# Patient Record
Sex: FEMALE | Race: WHITE
[De-identification: names, ages, dates, MRNs, and addresses within clinical notes are randomized per-mention and may not be internally consistent; named-entity substitution may affect disease eponyms.]

---

## 2019-10-28 ENCOUNTER — HOSPITAL ENCOUNTER (OUTPATIENT)
Dept: HOSPITAL 75 - RAD | Age: 19
End: 2019-10-28
Attending: INTERNAL MEDICINE
Payer: COMMERCIAL

## 2019-10-28 DIAGNOSIS — K59.09: Primary | ICD-10-CM

## 2019-10-28 PROCEDURE — 74250 X-RAY XM SM INT 1CNTRST STD: CPT

## 2019-11-01 NOTE — DIAGNOSTIC IMAGING REPORT
INDICATION: Constipation.



Abdominal films were obtained on 10/28/2019 and 11/01/2019.



FINDINGS: Film labeled 10/28/2019 demonstrates multiple Sitz

markers overlying the left upper quadrant, probably in the

gastric fundus. There is prominent stool throughout the colon.

There is no small bowel dilatation.



Film labeled day 5 on 11/01/2019 demonstrates five remaining Sitz

markers, two appearing over the distal descending colon and three

appear to be in the mid pelvis, probably in the sigmoid colon.

There is no small bowel distention. There appears to be a little

less stool in the colon compared to the prior study.



IMPRESSION:



No sign of bowel obstruction. Prominent stool in the colon.

Images obtained on day 1 (10/28/2019) and a 5 (11/01/2019)

demonstrate progression of Sitz markers. On the day 5 study,

there are five remaining Sitz markers in the colon, two in the

distal descending colon and three in the sigmoid region.



  Dictated on workstation # [id number]

## 2020-12-21 ENCOUNTER — HOSPITAL ENCOUNTER (OUTPATIENT)
Dept: HOSPITAL 75 - 4TH | Age: 20
Setting detail: OBSERVATION
LOS: 1 days | Discharge: HOME | End: 2020-12-22
Attending: FAMILY MEDICINE | Admitting: FAMILY MEDICINE
Payer: COMMERCIAL

## 2020-12-21 VITALS — DIASTOLIC BLOOD PRESSURE: 83 MMHG | SYSTOLIC BLOOD PRESSURE: 137 MMHG

## 2020-12-21 VITALS — DIASTOLIC BLOOD PRESSURE: 70 MMHG | SYSTOLIC BLOOD PRESSURE: 129 MMHG

## 2020-12-21 VITALS — WEIGHT: 143.74 LBS | BODY MASS INDEX: 23.95 KG/M2 | HEIGHT: 65 IN

## 2020-12-21 VITALS — DIASTOLIC BLOOD PRESSURE: 69 MMHG | SYSTOLIC BLOOD PRESSURE: 130 MMHG

## 2020-12-21 DIAGNOSIS — R11.2: ICD-10-CM

## 2020-12-21 DIAGNOSIS — K44.9: ICD-10-CM

## 2020-12-21 DIAGNOSIS — K59.09: ICD-10-CM

## 2020-12-21 DIAGNOSIS — R10.9: Primary | ICD-10-CM

## 2020-12-21 DIAGNOSIS — Z79.899: ICD-10-CM

## 2020-12-21 DIAGNOSIS — K21.9: ICD-10-CM

## 2020-12-21 DIAGNOSIS — I10: ICD-10-CM

## 2020-12-21 DIAGNOSIS — F41.9: ICD-10-CM

## 2020-12-21 LAB
ALBUMIN SERPL-MCNC: 4.3 GM/DL (ref 3.2–4.5)
ALP SERPL-CCNC: 60 U/L (ref 40–136)
ALT SERPL-CCNC: 17 U/L (ref 0–55)
BASOPHILS # BLD AUTO: 0 10^3/UL (ref 0–0.1)
BASOPHILS NFR BLD AUTO: 1 % (ref 0–10)
BILIRUB SERPL-MCNC: 0.6 MG/DL (ref 0.1–1)
BUN/CREAT SERPL: 18
CALCIUM SERPL-MCNC: 9 MG/DL (ref 8.5–10.1)
CHLORIDE SERPL-SCNC: 106 MMOL/L (ref 98–107)
CO2 SERPL-SCNC: 21 MMOL/L (ref 21–32)
CREAT SERPL-MCNC: 0.82 MG/DL (ref 0.6–1.3)
EOSINOPHIL # BLD AUTO: 0.1 10^3/UL (ref 0–0.3)
EOSINOPHIL NFR BLD AUTO: 1 % (ref 0–10)
GFR SERPLBLD BASED ON 1.73 SQ M-ARVRAT: > 60 ML/MIN
GLUCOSE SERPL-MCNC: 96 MG/DL (ref 70–105)
HCT VFR BLD CALC: 39 % (ref 35–52)
HGB BLD-MCNC: 13.4 G/DL (ref 11.5–16)
LYMPHOCYTES # BLD AUTO: 1.5 10^3/UL (ref 1–4)
LYMPHOCYTES NFR BLD AUTO: 20 % (ref 12–44)
MANUAL DIFFERENTIAL PERFORMED BLD QL: NO
MCH RBC QN AUTO: 30 PG (ref 25–34)
MCHC RBC AUTO-ENTMCNC: 34 G/DL (ref 32–36)
MCV RBC AUTO: 86 FL (ref 80–99)
MONOCYTES # BLD AUTO: 0.4 10^3/UL (ref 0–1)
MONOCYTES NFR BLD AUTO: 5 % (ref 0–12)
NEUTROPHILS # BLD AUTO: 5.4 10^3/UL (ref 1.8–7.8)
NEUTROPHILS NFR BLD AUTO: 73 % (ref 42–75)
PLATELET # BLD: 209 10^3/UL (ref 130–400)
PMV BLD AUTO: 10.4 FL (ref 9–12.2)
POTASSIUM SERPL-SCNC: 4 MMOL/L (ref 3.6–5)
PROT SERPL-MCNC: 7.2 GM/DL (ref 6.4–8.2)
SODIUM SERPL-SCNC: 138 MMOL/L (ref 135–145)
WBC # BLD AUTO: 7.5 10^3/UL (ref 4.3–11)

## 2020-12-21 PROCEDURE — 99211 OFF/OP EST MAY X REQ PHY/QHP: CPT

## 2020-12-21 PROCEDURE — 74178 CT ABD&PLV WO CNTR FLWD CNTR: CPT

## 2020-12-21 PROCEDURE — 80053 COMPREHEN METABOLIC PANEL: CPT

## 2020-12-21 PROCEDURE — 85025 COMPLETE CBC W/AUTO DIFF WBC: CPT

## 2020-12-21 PROCEDURE — 36415 COLL VENOUS BLD VENIPUNCTURE: CPT

## 2020-12-21 RX ADMIN — SODIUM CHLORIDE, SODIUM LACTATE, POTASSIUM CHLORIDE, AND CALCIUM CHLORIDE SCH MLS/HR: 600; 310; 30; 20 INJECTION, SOLUTION INTRAVENOUS at 22:30

## 2020-12-21 RX ADMIN — LORAZEPAM PRN MG: 2 INJECTION INTRAMUSCULAR; INTRAVENOUS at 20:12

## 2020-12-21 RX ADMIN — SODIUM CHLORIDE, SODIUM LACTATE, POTASSIUM CHLORIDE, AND CALCIUM CHLORIDE SCH MLS/HR: 600; 310; 30; 20 INJECTION, SOLUTION INTRAVENOUS at 15:41

## 2020-12-21 RX ADMIN — ACETAMINOPHEN PRN MG: 500 TABLET ORAL at 18:54

## 2020-12-21 RX ADMIN — FENTANYL CITRATE PRN MCG: 50 INJECTION, SOLUTION INTRAMUSCULAR; INTRAVENOUS at 21:02

## 2020-12-21 NOTE — HISTORY & PHYSICIAL
History of Present Illness


History of Present Illness


Reason for visit/HPI


PT IS A 21 Y/O FEMALE WHO IS A NEW PATIENT TO MY PRACTICE.  SHE PRESENTED TO THE

OFFICE TODAY WITH SEVERE ABDOMINAL PAIN AND CONSTIPATION THAT CAUSED NAUSEA AND 

EMESIS.  SHE REPORTS THAT SHE WAS FEELING LIKE SHE NEEDED A BOWEL MOVEMENT AND 

SHE SAT DOWN ON THE TOILET, ATTEMPTING TO EMPTY HER BOWELS, AND BECAME ACUTELY 

NAUSEATED.


SHE REPORTS AN EXTENSIVE GI HISTORY AND ACTUALLY HAD A PHONE CALL FROM HER 

PEDIATRIC GASTROENTEROLOGIST TODAY INSTRUCTING HER TO GO TO THE EMERGENCY 

DEPARTMENT FOR ACUTE EVALUATION WHILE SHE WAS IN MY OFFICE BEING EXAMINED THIS 

AFTERNOON.


SHE REPORTS AN EXTENSIVE CONSTIPATION HISTORY WITH A COLONOSCOPE LAST YEAR, AND 

AN EGD AS WELL LAST YEAR.


Date of Admission


Dec 21, 2020 at 14:20


Date Seen by a Provider:  Dec 21, 2020


Time Seen by a Provider:  18:15


I consulted on this patient on


12/21/20


 18:08


Attending Physician


Nancy Figueroa MD


Admitting Physician


Nancy Figueroa MD


Consult


DR. WEI





Allergies and Home Medications


Allergies


Coded Allergies:  


     No Known Drug Allergies (Unverified , 12/21/20)





Patient Home Medication List


Home Medication List Reviewed:  Yes





Past Medical-Social-Family Hx


Patient Social History


Marrital Status:  single


Number of Children:  0


Number of living children:  0


Living Status:  LIVES IN [facility]


Employed/Student:  employed, student, full-time


Smoking Status:  Never a Smoker


2nd Hand Smoke Exposure:  No


Physical Abuse Screen:  No


Sexual Abuse:  No


Recent Foreign Travel:  No


Contact w/other who traveled:  No


Recent Hopitalizations:  No


Recent Infectious Disease Expo:  Yes (WORKS AT [employer]URSING HOME  - EXPOSURE TO 

COVID)


Social History


WORKS AT [employer] IN THE [employer]SING HOME





Immunizations Up To Date


Date of Influenza Vaccine:  Oct 1, 2020





Surgeries


Yes


Abdominal (LAPROSCOPIC FUNDOPLICATION)





Respiratory


No


Currently Using CPAP:  No


Currently Using BIPAP:  No





Cardiovascular


No





Neurological


No





Reproductive System


Pregnant:  No


Hx Reproductive Disorders:  No


Sexually Transmitted Disease:  No


HIV/AIDS:  No


Female Reproductive Disorders:  Denies





Genitourinary


No





Gastrointestinal


Yes


Gastroesophageal Reflux, Chronic Constipation, Hiatal Hernia





Musculoskeletal


No





Endocrine


History of Endocrine Disorders:  No





HEENT


History of HEENT Disorders:  No


Loss of Vision:  Denies


Hearing Impairment:  Denies





Cancer


No





Psychosocial


History of Psychiatric Problem:  No





Integumentary


History of Skin or Integumenta:  No





Blood Transfusions


History of Blood Disorders:  No


Adverse Reaction to a Blood Tr:  No





Reviewed Nursing Assessment


Reviewed/Agree w Nursing PMH:  Yes





Family Medical History


Significant Family History:  Hypertension, Other Conditions/Hx (MEGACOLON IN 

GRANDMOTHER)





Review of Systems


Constitutional:  No chills, No fever, No malaise, No weakness, No weight loss


EENTM:  No hoarseness, No throat pain


Respiratory:  No cough, No dyspnea on exertion, No short of breath


Cardiovascular:  No chest pain, No edema, No palpitations


Gastrointestinal:  abdominal pain (RLQ, LLQ, SLIGHTLY TTP OVER UPPER ABDOMEN AS 

WELL)


Genitourinary:  no symptoms reported


Musculoskeletal:  no symptoms reported


Skin:  no symptoms reported


Psychiatric/Neurological:  No Symptoms Reported; Denies Weakness





All Other Systems Reviewed


Negative Unless Noted:  Yes





Physical Exam


Vital Signs





Vital Signs - First Documented








 12/21/20





 14:44


 


Temp 36.2


 


Pulse 100


 


Resp 20


 


B/P (MAP) 137/83


 


Pulse Ox 100


 


O2 Delivery Room Air





Capillary Refill :


Height, Weight, BMI


Height: '"


Weight: lbs. oz. kg; 23.91 BMI


Method:


General Appearance:  WD/WN, Mild Distress (DUE TO PAIN)


Eyes:  Bilateral Eye Normal Inspection, Bilateral Eye PERRL, Bilateral Eye EOMI


HEENT:  PERRL/EOMI, Pharynx Normal


Neck:  Full Range of Motion, Normal Inspection, Non Tender, Supple


Respiratory:  Chest Non Tender, Lungs Clear, Normal Breath Sounds, No Accessory 

Muscle Use, No Respiratory Distress


Cardiovascular:  Regular Rate, Rhythm, No Edema, Normal Peripheral Pulses


Gastrointestinal:  Tenderness (DIFFUSELY OVER LOWER ABDOMEN WITH SOME MILD TTP 

OVER UPPER ABDOMEN BILATERALLY), Other (DECREASED BOWEL SOUNDS)


Rectal:  Deferred


Back:  Normal Inspection, No Vertebral Tenderness


Extremity:  Normal Capillary Refill, Normal Inspection, Normal Range of Motion, 

Non Tender, No Calf Tenderness, No Pedal Edema


Neurologic/Psychiatric:  Alert, Oriented x3, No Motor/Sensory Deficits, Normal 

Mood/Affect, CNs II-XII Norm as Tested


Skin:  Normal Color, Warm/Dry


Lymphatic:  No Adenopathy





Assessment/Plan


Assessment and Plan


NAUSEA


ABDOMINAL PAIN


CHRONIC SLOW TRANSIT CONSTIPATION








NAUSEA


   - STARTED NPO, WILL ADVANCE TO LIQUID DIET.


   


ABDOMINAL PAIN WITH CHRONIC SLOW TRANSIT CONSTIPATION


   - CONSULT TO DR. WEI


   - DISCUSSED CT SCAN WHILE I WAS IN THE ROOM WITH THE PATIENT.


   - START BOWEL PREP TODAY - MIRALAX TONIGHT AND DULCOLAX TOMORROW MORNING, 

WILL REVIEW HER OLD RECORDS, MAY NEED TO PURSUE FURTHER IMAGING TOMORROW ONCE 

HER BOWELS ARE MORE CLEARED OUT.


   - PRN ZOFRAN


   - PRN PAIN MEDICATION





Admission Diagnosis


NAUSEA


ABDOMINAL PAIN


CHRONIC SLOW TRANSIT CONSTIPATION


Admission Status:  Observation











NANCY FIGUEROA MD            Dec 21, 2020 18:14

## 2020-12-21 NOTE — CONSULTATION - SURGERY
History of Present Illness


History of Present Illness


Patient Consulted On(olvin/time)


12/21/20


 18:21


Time Seen by Provider:  17:01


History of Present Illness


Surgery asked to consult regarding abdominal pain and constipation.





HPI per IM:  PT IS A 19 Y/O FEMALE WHO IS A NEW PATIENT TO MY PRACTICE.  SHE 

PRESENTED TO THE OFFICE TODAY WITH SEVERE ABDOMINAL PAIN AND CONSTIPATION THAT 

CAUSED NAUSEA AND EMESIS.  SHE REPORTS THAT SHE WAS FEELING LIKE SHE NEEDED A 

BOWEL MOVEMENT AND SHE SAT DOWN ON THE TOILET, ATTEMPTING TO EMPTY HER BOWELS, 

AND BECAME ACUTELY NAUSEATED.


SHE REPORTS AN EXTENSIVE GI HISTORY AND ACTUALLY HAD A PHONE CALL FROM HER 

PEDIATRIC GASTROENTEROLOGIST TODAY INSTRUCTING HER TO GO TO THE EMERGENCY 

DEPARTMENT FOR ACUTE EVALUATION WHILE SHE WAS IN MY OFFICE BEING EXAMINED THIS 

AFTERNOON.


SHE REPORTS AN EXTENSIVE CONSTIPATION HISTORY WITH A COLONOSCOPY LAST YEAR, AND 

AN EGD AS WELL LAST YEAR.





When I spoke to pt she states she almost always has abdominal soreness, but the 

past 2-3 days have been much worse.  Rating the pain as 9 out of 10, diffusely 

over lower abdomen.  She states she never empties fully and usually feels like 

she has to go but nothing comes out at all.





Allergies and Home Medications


Allergies


Coded Allergies:  


     No Known Drug Allergies (Unverified , 12/21/20)





Patient Home Medication List


Home Medication List Reviewed:  Yes





Past Medical-Social-Family Hx


Patient Social History


Alcohol Use:  Denies Use


Recreational Drug Use:  No


Smoking Status:  Never a Smoker


Recent Foreign Travel:  No


Contact w/Someone Who Travel:  No


Recent Infectious Disease Expo:  No





Immunizations Up To Date


Date of Influenza Vaccine:  Oct 1, 2020





Surgeries


History of Surgeries:  Yes (Hiatal hernia repair)





Respiratory


History of Respiratory Disorde:  No





Cardiovascular


History of Cardiac Disorders:  No





Neurological


History of Neurological Disord:  No





Reproductive System


Pregnant:  No





Genitourinary


History of Genitourinary Disor:  No





Gastrointestinal


History of Gastrointestinal Di:  Yes


Gastrointestinal Disorders:  Gastroesophageal Reflux, Obstructive Bowel, Chronic

Constipation, Hiatal Hernia, Irritable Bowel





Musculoskeletal


History of Musculoskeletal Dis:  No





Endocrine


History of Endocrine Disorders:  No





HEENT


History of HEENT Disorders:  No


Loss of Vision:  Denies


Hearing Impairment:  Denies





Cancer


History of Cancer:  No





Psychosocial


History of Psychiatric Problem:  No





Integumentary


History of Skin or Integumenta:  No





Blood Transfusions


History of Blood Disorders:  No





Family Medical History


Significant Family History:  GI Disease (Grandmother had megacolon), 

Hypertension (Father)





Review of Systems-General


Constitutional:  No chills; diaphoresis (when she feels like she has to have 

BM); No malaise, No weakness


EENTM:  No blurred vision, No double vision, No mouth pain, No mouth swelling, 

No epistaxis


Respiratory:  No cough, No dyspnea on exertion, No hemoptysis


Cardiovascular:  No chest pain, No edema, No palpitations


Gastrointestinal:  abdominal pain; No dysphagia, No hematemesis, No jaundice; 

nausea, vomiting


Genitourinary:  No dysuria, No frequency, No hematuria


Musculoskeletal:  No joint pain, No joint swelling, No muscle pain, No muscle 

stiffness


Skin:  No change in color, No change in hair/nails


Psychiatric/Neurological:  Denies Anxiety, Denies Depressed, Denies Seizure, 

Denies Tremors


Other


pt denies any history of abnormal bleeding or bruising





Physical Exam-General Problems


Physical Exam


Vital Signs





Vital Signs - First Documented








 12/21/20





 14:44


 


Temp 36.2


 


Pulse 100


 


Resp 20


 


B/P (MAP) 137/83


 


Pulse Ox 100


 


O2 Delivery Room Air





Capillary Refill :


General Appearance:  WD/WN, no apparent distress


Eyes:  Bilateral Eye PERRL, Bilateral Eye EOMI


HEENT:  pharynx normal; No scleral icterus (R), No scleral icterus (L), No pale 

conjunctivae (R), No pale conjunctivae (L)


Neck:  non-tender, full range of motion, supple, normal inspection


Respiratory:  chest non-tender, lungs clear, normal breath sounds, no 

respiratory distress, no accessory muscle use


Cardiovascular:  regular rate, rhythm, no murmur


Gastrointestinal:  soft, no organomegaly, hernia (small umbilical hernia)


Back:  no CVA tenderness, no vertebral tenderness


Extremities:  non-tender, normal inspection, no pedal edema, no calf tenderness


Neurologic/Psychiatric:  CNs II-XII nml as tested, no motor/sensory deficits, 

alert, normal mood/affect, oriented x 3


Skin:  normal color, warm/dry


Lymphatic:  no adenopathy (neck, axilla or groin)





Data Review


Labs


Laboratory Tests


12/21/20 14:40: 


White Blood Count 7.5, Red Blood Count 4.55, Hemoglobin 13.4, Hematocrit 39, 

Mean Corpuscular Volume 86, Mean Corpuscular Hemoglobin 30, Mean Corpuscular 

Hemoglobin Concent 34, Red Cell Distribution Width 11.6, Platelet Count 209, M

makayla Platelet Volume 10.4, Immature Granulocyte % (Auto) 0, Neutrophils (%) 

(Auto) 73, Lymphocytes (%) (Auto) 20, Monocytes (%) (Auto) 5, Eosinophils (%) 

(Auto) 1, Basophils (%) (Auto) 1, Neutrophils # (Auto) 5.4, Lymphocytes # (Auto)

1.5, Monocytes # (Auto) 0.4, Eosinophils # (Auto) 0.1, Basophils # (Auto) 0.0, 

Immature Granulocyte # (Auto) 0.0, Sodium Level 138, Potassium Level 4.0, 

Chloride Level 106, Carbon Dioxide Level 21, Anion Gap 11, Blood Urea Nitrogen 

15, Creatinine 0.82, Estimat Glomerular Filtration Rate > 60, BUN/Creatinine 

Ratio 18, Glucose Level 96, Calcium Level 9.0, Corrected Calcium 8.8, Total 

Bilirubin 0.6, Aspartate Amino Transf (AST/SGOT) 20, Alanine Aminotransferase 

(ALT/SGPT) 17, Alkaline Phosphatase 60, Total Protein 7.2, Albumin 4.3





Radiology


                                  ***Signed***


Date of Exam:12/21/20





CT ABDOMEN/PELVIS W WO








EXAMINATION: CT Abdomen Pelvis with and without intravenous


contrast.





TECHNIQUE: Precontrast acquisitions were acquired through the


abdomen and pelvis. Multiple contiguous axial images were


obtained through the abdomen and pelvis after the administration


of intravenous contrast. All CT scans use one or more of the


following dose optimizing techniques: automated exposure control,


MA and/or KvP adjustment based on a patient size and exam type,


or iterative reconstruction. 





HISTORY: Abdominal pain, irritable bowel syndrome.





COMPARISON: None available.





FINDINGS: 





Limited views of the lower thorax are unremarkable.





The liver is normal without focal lesion. There is no biliary


ductal dilation. Gallbladder is normal.  Pancreas is normal. 


Spleen is normal. Adrenal glands are normal.





The kidneys are normal. There is no hydronephrosis. Urinary


bladder is normal.





Visualized bowel is normal in caliber without obstruction or


inflammation. No free fluid or air. No abdominal or pelvic


lymphadenopathy. Aorta is normal in caliber without aneurysm.





There are no suspicious osseous lesions.





IMPRESSION:





1. No acute abnormality in the abdomen or pelvis.





Dictated by: 





  Dictated on workstation # [id number]








Dict:   12/21/20 [id number]


Trans:   12/21/20 [id number]


[facility] [id number]





Interpreted by:     ARUN SERRATO MD


Electronically signed by: ARUN SERRATO MD 12/21/20 0349





Assessment/Plan


Chronic Constipation


Abdominal pain


Nausea and Vomiting





I spent over 65 minutes in total with pt; I reviewed her CT for 5 minutes, went 

over faxed reports of her previous CT's, Abd xray, Barium enema, hiatal hernia 

repair (at least 15 minutes).  I then talked to her about all her options; from 

doing nothing, to getting all old info and determining what other studies need 

to be done, plus bowel regimens and even Total Colectomy (which I did not 

recommend at all, only mentioned it because it is an option).  I also plan on 

talking to the radiologist to go over some of her old films.  I think she would 

benefit from being on a bowel regimen and trying to train her body to have BM's.

 However, all of this takes time and will not happen immediately.  In addition, 

we know she has slow transit because the radiology test with Sitz markers showed

rings still in the colon at 5 days.  At this time we are going to do a bowel 

prep; as if she were going to have a colonoscopy, because she felt the best ever

for the 3 days following her last colonoscopy.  This work-up will take time and 

she will not need to be in the hospital the entire time.  We will also make sure

she has anti-emetics and pain control as needed.  I spent easily 50 minutes 

going over her options and explaining what our plans were.  I then spent another

10 minutes in consultation with Dr. Figueroa.











GIULIANA WEI DO               Dec 21, 2020 18:40

## 2020-12-21 NOTE — DIAGNOSTIC IMAGING REPORT
EXAMINATION: CT Abdomen Pelvis with and without intravenous

contrast.



TECHNIQUE: Precontrast acquisitions were acquired through the

abdomen and pelvis. Multiple contiguous axial images were

obtained through the abdomen and pelvis after the administration

of intravenous contrast. All CT scans use one or more of the

following dose optimizing techniques: automated exposure control,

MA and/or KvP adjustment based on a patient size and exam type,

or iterative reconstruction. 



HISTORY: Abdominal pain, irritable bowel syndrome.



COMPARISON: None available.



FINDINGS: 



Limited views of the lower thorax are unremarkable.



The liver is normal without focal lesion. There is no biliary

ductal dilation. Gallbladder is normal.  Pancreas is normal. 

Spleen is normal. Adrenal glands are normal.



The kidneys are normal. There is no hydronephrosis. Urinary

bladder is normal.



Visualized bowel is normal in caliber without obstruction or

inflammation. No free fluid or air. No abdominal or pelvic

lymphadenopathy. Aorta is normal in caliber without aneurysm.



There are no suspicious osseous lesions.



IMPRESSION:



1. No acute abnormality in the abdomen or pelvis.



Dictated by: 



  Dictated on workstation # [id number]

## 2020-12-21 NOTE — NUR
GARRISONSHAMEKA admitted to room 406-1, with an admitting diagnosis of ABDOMINAL PAIN HX OF 
MEGACOLON TWISTED BOWEL, on 12/21/20 from DIRECT ADMISSION via AMBULATORY, accompanied by 
SELF. SHAMEKA JI introduced to surroundings, call light, bed controls, phone, TV, 
temperature control, lights, meal times, smoking policy, visitor policy, side rail policy, 
bathrooms and showers.  Patient Rights given to patient in the handbook. SHAMEKA JI 
verbalizes understanding that [facility] is not responsible for the loss or damage to any 
personal effects or valuables that are kept in the patients posession during their 
hospitalization. SHAMEKA JI verbalizes understanding of Interdisciplinary Patient 
Education. Patient and/or family were informed about the Rapid Response Team and its 
purpose.

## 2020-12-22 VITALS — SYSTOLIC BLOOD PRESSURE: 119 MMHG | DIASTOLIC BLOOD PRESSURE: 68 MMHG

## 2020-12-22 VITALS — DIASTOLIC BLOOD PRESSURE: 62 MMHG | SYSTOLIC BLOOD PRESSURE: 111 MMHG

## 2020-12-22 VITALS — DIASTOLIC BLOOD PRESSURE: 76 MMHG | SYSTOLIC BLOOD PRESSURE: 125 MMHG

## 2020-12-22 VITALS — DIASTOLIC BLOOD PRESSURE: 82 MMHG | SYSTOLIC BLOOD PRESSURE: 123 MMHG

## 2020-12-22 RX ADMIN — LORAZEPAM PRN MG: 2 INJECTION INTRAMUSCULAR; INTRAVENOUS at 08:29

## 2020-12-22 RX ADMIN — SODIUM CHLORIDE, SODIUM LACTATE, POTASSIUM CHLORIDE, AND CALCIUM CHLORIDE SCH MLS/HR: 600; 310; 30; 20 INJECTION, SOLUTION INTRAVENOUS at 05:24

## 2020-12-22 RX ADMIN — FENTANYL CITRATE PRN MCG: 50 INJECTION, SOLUTION INTRAMUSCULAR; INTRAVENOUS at 04:14

## 2020-12-22 RX ADMIN — LORAZEPAM PRN MG: 2 INJECTION INTRAMUSCULAR; INTRAVENOUS at 13:31

## 2020-12-22 RX ADMIN — LORAZEPAM PRN MG: 2 INJECTION INTRAMUSCULAR; INTRAVENOUS at 04:04

## 2020-12-22 RX ADMIN — ACETAMINOPHEN PRN MG: 500 TABLET ORAL at 11:47

## 2020-12-22 RX ADMIN — LORAZEPAM PRN MG: 2 INJECTION INTRAMUSCULAR; INTRAVENOUS at 00:11

## 2020-12-22 RX ADMIN — SODIUM CHLORIDE, SODIUM LACTATE, POTASSIUM CHLORIDE, AND CALCIUM CHLORIDE SCH MLS/HR: 600; 310; 30; 20 INJECTION, SOLUTION INTRAVENOUS at 11:38

## 2020-12-22 NOTE — PROGRESS NOTE - SURGERY
Subjective


Time Seen by a Provider:  08:54


Subjective/Events-last exam


Pt seen and examined, states she is actually feeling a little better today.  The

colon prep caused her to have multiple BM's; which she said were mostly formed.


Review of Systems


General:  No Chills, No Night Sweats


Pulmonary:  No Dyspnea, No Cough


Cardiovascular:  No: Chest Pain, Palpitations


Gastrointestinal:  Abdominal Pain, Constipation; No: Nausea, Vomiting





Objective


Exam





Vital Signs








  Date Time  Temp Pulse Resp B/P (MAP) Pulse Ox O2 Delivery O2 Flow Rate FiO2


 


12/22/20 08:00 36.4 97 20 125/76 (92) 98 Room Air  


 


12/22/20 04:04 36.5 80 18 119/68 (85) 98 Room Air  


 


12/22/20 00:09 36.3 85 18 111/62 (78) 97 Room Air  


 


12/21/20 20:00     97 Room Air  


 


12/21/20 19:29 36.8 80 16 129/70 (89) 97 Room Air  


 


12/21/20 16:30 36.6 76 18 130/69 (89) 97 Room Air  


 


12/21/20 15:00      Room Air  


 


12/21/20 14:44 36.2 100 20 137/83 100 Room Air  














I & O 


 


 12/22/20





 07:00


 


Intake Total 4200 ml


 


Output Total 700 ml


 


Balance 3500 ml





Capillary Refill : Less Than 3 Seconds


General Appearance:  No Apparent Distress, WD/WN


HEENT:  PERRL/EOMI


Respiratory:  Chest Non Tender, Lungs Clear, Normal Breath Sounds, No Accessory 

Muscle Use, No Respiratory Distress


Cardiovascular:  Regular Rate, Rhythm, Normal Peripheral Pulses


Gastrointestinal:  soft, no organomegaly, tenderness (minimally diffusely), 

hernia (small umbilical hernia)


Extremity:  No Calf Tenderness, No Pedal Edema


Neurologic/Psychiatric:  Alert, Oriented x3, Normal Mood/Affect, CNs II-XII Norm

as Tested


Skin:  Normal Color, Warm/Dry





Results


Lab


Laboratory Tests


12/21/20 14:40: 


White Blood Count 7.5, Red Blood Count 4.55, Hemoglobin 13.4, Hematocrit 39, 

Mean Corpuscular Volume 86, Mean Corpuscular Hemoglobin 30, Mean Corpuscular 

Hemoglobin Concent 34, Red Cell Distribution Width 11.6, Platelet Count 209, 

Mean Platelet Volume 10.4, Immature Granulocyte % (Auto) 0, Neutrophils (%) 

(Auto) 73, Lymphocytes (%) (Auto) 20, Monocytes (%) (Auto) 5, Eosinophils (%) 

(Auto) 1, Basophils (%) (Auto) 1, Neutrophils # (Auto) 5.4, Lymphocytes # (Auto)

1.5, Monocytes # (Auto) 0.4, Eosinophils # (Auto) 0.1, Basophils # (Auto) 0.0, 

Immature Granulocyte # (Auto) 0.0, Sodium Level 138, Potassium Level 4.0, 

Chloride Level 106, Carbon Dioxide Level 21, Anion Gap 11, Blood Urea Nitrogen 

15, Creatinine 0.82, Estimat Glomerular Filtration Rate > 60, BUN/Creatinine 

Ratio 18, Glucose Level 96, Calcium Level 9.0, Corrected Calcium 8.8, Total 

Bilirubin 0.6, Aspartate Amino Transf (AST/SGOT) 20, Alanine Aminotransferase 

(ALT/SGPT) 17, Alkaline Phosphatase 60, Total Protein 7.2, Albumin 4.3





Assessment/Plan


Chronic Constipation


Abdominal pain


Nausea and Vomiting





Pt is doing better today and the plan is to finish off the bowel prep.  She is 

tolerating clears and was told to ambulate as much as possible today.  Will 

monitor her progress, ?home today vs. tomorrow.











GIULIANA WEI DO               Dec 22, 2020 11:40

## 2020-12-22 NOTE — NUR
SHAMEKA JI demonstrates understanding of discharge instructions and accurately returns 
instructions upon questioning.  Copy of Post-Discharge Instructions and Medication Discharge 
Instructions given to patient. SHAMEKA JI is able to manage continuing needs after 
discharge.  Patients belongings returned to her. Skin dry and intact; no breakdown noted. 
Patient discharged [facility] on 12/22/20 at 1400 . SHAMEKA JI left floor via wheelchair, 
accompanied by staff.

## 2020-12-22 NOTE — DISCHARGE SUMMARY
Discharge Summary


Hospital Course


Was the Problem List Reviewed?:  Yes


Hospital Course


Date of Admission: Dec 21, 2020 at 14:20 


Admission Diagnosis :  





Family Physician/Provider: Italia Figueroa MD  





Date of Discharge: 12/22/20 


Discharge Diagnosis:





1.  Abdominal Pain--improved


2.  Acute Nausea and Vomiting--improved


3.  Chronic Slow Transit Constipation--passing stools


4.  Anxiety--stable





Hospital Course:  


This is a 21 yo female with a history of chronic constipation who was admitted 

from Dr. Figueroa's office for abdominal pain with constipation and nausea and 

vomiting.  A CT scan was done on admission and showed no bowel obstruction or 

other abnormalities.  Surgery was consulted and after reviewing her previous 

imaging studies and colonoscopy results it was decided to proceed with a 

colonoscopy type prep to clear her colon.  She had antiemetics for nausea as 

well as fentanyl for pain and lorazepam for anxiety.  She was given miralax as 

well as dulcolax and has had several bowel movements overnight and is feeling 

much better.   Surgery as well as Dr. Figueroa and myself did discuss continuing

with an aggressive bowel regimen to keep her from getting constipated as an 

outpatient.  Her nausea and vomiting is resolved and her abdominal pain is much 

better so it is felt she can be discharged home to continue with a bowel regimen

and follow up with Dr. Figueroa in 1 week.

















Labs and Pending Lab Test:


Laboratory Tests


12/21/20 14:40: 


White Blood Count 7.5, Red Blood Count 4.55, Hemoglobin 13.4, Hematocrit 39, 

Mean Corpuscular Volume 86, Mean Corpuscular Hemoglobin 30, Mean Corpuscular 

Hemoglobin Concent 34, Red Cell Distribution Width 11.6, Platelet Count 209, 

Mean Platelet Volume 10.4, Immature Granulocyte % (Auto) 0, Neutrophils (%) 

(Auto) 73, Lymphocytes (%) (Auto) 20, Monocytes (%) (Auto) 5, Eosinophils (%) 

(Auto) 1, Basophils (%) (Auto) 1, Neutrophils # (Auto) 5.4, Lymphocytes # (Auto)

1.5, Monocytes # (Auto) 0.4, Eosinophils # (Auto) 0.1, Basophils # (Auto) 0.0, 

Immature Granulocyte # (Auto) 0.0, Sodium Level 138, Potassium Level 4.0, 

Chloride Level 106, Carbon Dioxide Level 21, Anion Gap 11, Blood Urea Nitrogen 

15, Creatinine 0.82, Estimat Glomerular Filtration Rate > 60, BUN/Creatinine 

Ratio 18, Glucose Level 96, Calcium Level 9.0, Corrected Calcium 8.8, Total 

Bilirubin 0.6, Aspartate Amino Transf (AST/SGOT) 20, Alanine Aminotransferase 

(ALT/SGPT) 17, Alkaline Phosphatase 60, Total Protein 7.2, Albumin 4.3





Home Meds


Active


Miralax (Polyethylene Glycol 3350) 17 Gm Powd.pack 17 Gm PO DAILY


Bisacodyl 5 Mg Tablet.dr 5 Mg PO BID


Fluoxetine HCl 20 Mg Capsule 60 Mg PO DAILY 30 Days


Assessment/Pt Instructions


1.  Abdominal Pain--improved


2.  Acute Nausea and Vomiting--improved


3.  Chronic Slow Transit Constipation--passing stools


4.  Anxiety--stable





Discharge Instructions


Discharge Diet:  No Restrictions


Activity as Tolerated:  Yes





Discharge Physical Examination


Vital Signs





Vital Signs








  Date Time  Temp Pulse Resp B/P (MAP) Pulse Ox O2 Delivery O2 Flow Rate FiO2


 


12/22/20 12:00 36.8 100 20 123/82 (96) 99 Room Air  








General Appearance:  No Apparent Distress


Respiratory:  Lungs Clear


Cardiovascular:  Regular Rate, Rhythm


Gastrointestinal:  Normal Bowel Sounds, Soft, Tenderness (mild LLQ)


Extremity:  Non Tender, No Calf Tenderness, No Pedal Edema


Skin:  Warm/Dry


Neurologic/Psychiatric:  Alert, Oriented x3


Allergies:  


Coded Allergies:  


     No Known Drug Allergies (Unverified , 12/21/20)





Discharge Summary


Date of Admission


Dec 21, 2020 at 14:20


Date of Discharge





Discharge Date:  Dec 22, 2020











RADHA NJ DO        Dec 22, 2020 12:55